# Patient Record
Sex: FEMALE | Race: WHITE | Employment: FULL TIME | URBAN - METROPOLITAN AREA
[De-identification: names, ages, dates, MRNs, and addresses within clinical notes are randomized per-mention and may not be internally consistent; named-entity substitution may affect disease eponyms.]

---

## 2022-03-17 LAB — STREP GP B CULT OB: POSITIVE

## 2022-03-20 PROBLEM — R82.71 GBS BACTERIURIA: Status: ACTIVE | Noted: 2022-03-20

## 2022-03-31 PROBLEM — A56.8 CHLAMYDIA TRACHOMATIS INFECTION IN PREGNANCY: Status: ACTIVE | Noted: 2022-03-31

## 2022-03-31 PROBLEM — O98.319 CHLAMYDIA TRACHOMATIS INFECTION IN PREGNANCY: Status: ACTIVE | Noted: 2022-03-31

## 2022-04-08 PROBLEM — Z34.02 ENCOUNTER FOR SUPERVISION OF NORMAL FIRST PREGNANCY IN SECOND TRIMESTER: Status: ACTIVE | Noted: 2022-04-08

## 2022-04-08 PROBLEM — O09.529 ADVANCED MATERNAL AGE IN MULTIGRAVIDA, UNSPECIFIED TRIMESTER: Status: ACTIVE | Noted: 2022-04-08

## 2022-05-27 ENCOUNTER — HOSPITAL ENCOUNTER (OUTPATIENT)
Dept: PERINATAL CARE | Facility: HOSPITAL | Age: 37
Discharge: HOME | End: 2022-05-27
Attending: OBSTETRICS & GYNECOLOGY
Payer: COMMERCIAL

## 2022-05-27 VITALS
BODY MASS INDEX: 24 KG/M2 | WEIGHT: 156 LBS | DIASTOLIC BLOOD PRESSURE: 66 MMHG | SYSTOLIC BLOOD PRESSURE: 107 MMHG | HEART RATE: 78 BPM

## 2022-05-27 DIAGNOSIS — O09.529 ADVANCED MATERNAL AGE IN MULTIGRAVIDA, UNSPECIFIED TRIMESTER: ICD-10-CM

## 2022-05-27 DIAGNOSIS — O09.529 ADVANCED MATERNAL AGE IN MULTIGRAVIDA, UNSPECIFIED TRIMESTER: Primary | ICD-10-CM

## 2022-05-27 PROCEDURE — 36415 COLL VENOUS BLD VENIPUNCTURE: CPT

## 2022-05-27 PROCEDURE — 76811 OB US DETAILED SNGL FETUS: CPT | Performed by: OBSTETRICS & GYNECOLOGY

## 2022-05-27 NOTE — ADDENDUM NOTE
Encounter addended by: Felix Fletcher RN on: 5/27/2022 12:20 PM   Actions taken: Clinical Note Signed, Charge Capture section accepted

## 2022-05-27 NOTE — PROGRESS NOTES
Pt for level 2 US  Denies pregnancy complaints  States active fetus    Pt called after leaving  appointment  desires NIPT testing  Returned for NIPT testing

## 2022-06-03 ENCOUNTER — TELEPHONE (OUTPATIENT)
Dept: PERINATAL CARE | Facility: HOSPITAL | Age: 37
End: 2022-06-03

## 2022-06-03 NOTE — TELEPHONE ENCOUNTER
[facility] screening results obt  Reviewed by Dr David London  Negative results for Trisomy 18/13/21  Fetal fraction 23%      Pt  Reminded on Use of [facility] registration card   Given at  Previous appointment   Pt can log in  and view results    Pt states understanding  Copy of results sent for scanning into pt record

## 2022-07-12 LAB — HIV RESULT OB: NEGATIVE

## 2022-08-12 ENCOUNTER — HOSPITAL ENCOUNTER (OUTPATIENT)
Dept: PERINATAL CARE | Facility: HOSPITAL | Age: 37
Discharge: HOME | End: 2022-08-12
Attending: OBSTETRICS & GYNECOLOGY
Payer: COMMERCIAL

## 2022-08-12 VITALS
HEART RATE: 75 BPM | DIASTOLIC BLOOD PRESSURE: 68 MMHG | WEIGHT: 166 LBS | SYSTOLIC BLOOD PRESSURE: 108 MMHG | BODY MASS INDEX: 26 KG/M2

## 2022-08-12 DIAGNOSIS — E03.9 HYPOTHYROIDISM, UNSPECIFIED TYPE: ICD-10-CM

## 2022-08-12 DIAGNOSIS — O09.529 ADVANCED MATERNAL AGE IN MULTIGRAVIDA, UNSPECIFIED TRIMESTER: Primary | ICD-10-CM

## 2022-08-12 DIAGNOSIS — O09.529 ADVANCED MATERNAL AGE IN MULTIGRAVIDA, UNSPECIFIED TRIMESTER: ICD-10-CM

## 2022-08-12 PROCEDURE — 76816 OB US FOLLOW-UP PER FETUS: CPT | Performed by: OBSTETRICS & GYNECOLOGY

## 2022-08-12 PROCEDURE — 76819 FETAL BIOPHYS PROFIL W/O NST: CPT

## 2022-10-04 ENCOUNTER — ULTRASOUND ENCOUNTER (OUTPATIENT)
Dept: PERINATAL CARE | Facility: HOSPITAL | Age: 37
End: 2022-10-04
Attending: OBSTETRICS & GYNECOLOGY
Payer: COMMERCIAL

## 2022-10-04 ENCOUNTER — HOSPITAL ENCOUNTER (OUTPATIENT)
Facility: HOSPITAL | Age: 37
Discharge: HOME | End: 2022-10-04
Attending: OBSTETRICS & GYNECOLOGY | Admitting: OBSTETRICS & GYNECOLOGY
Payer: COMMERCIAL

## 2022-10-04 VITALS
HEART RATE: 95 BPM | WEIGHT: 169 LBS | SYSTOLIC BLOOD PRESSURE: 122 MMHG | BODY MASS INDEX: 26.53 KG/M2 | TEMPERATURE: 98 F | RESPIRATION RATE: 16 BRPM | HEIGHT: 67 IN | DIASTOLIC BLOOD PRESSURE: 78 MMHG

## 2022-10-04 DIAGNOSIS — O09.529 ADVANCED MATERNAL AGE IN MULTIGRAVIDA, UNSPECIFIED TRIMESTER: ICD-10-CM

## 2022-10-04 PROCEDURE — 76815 OB US LIMITED FETUS(S): CPT

## 2022-10-04 PROCEDURE — 76818 FETAL BIOPHYS PROFILE W/NST: CPT | Performed by: OBSTETRICS & GYNECOLOGY

## 2022-10-04 PROCEDURE — 99214 OFFICE O/P EST MOD 30 MIN: CPT

## 2022-10-04 PROCEDURE — 76819 FETAL BIOPHYS PROFIL W/O NST: CPT | Performed by: OBSTETRICS & GYNECOLOGY

## 2022-10-04 PROCEDURE — 59025 FETAL NON-STRESS TEST: CPT

## 2022-10-04 NOTE — PROGRESS NOTES
Physician Evaluation      NST Interpretation: Reactive    Disposition:   Discharged    Comments:    F/u in 2 days    Jorge Krause MD

## 2022-10-04 NOTE — CONSULTS
2 8Encounter for BPP nonreactive NST in the office. This is a 42-year-old  1 at 40 weeks 0 days. Her pregnancy has been uncomplicated until her nonreactive NST today. She reports she had some fruit in the morning for does not have anything to eat the remainder of the day. She was given some ice water in triage which did improve the fetal movement. I reviewed the fetal heart rate tracing from triage and the baseline was in the 130s. There was moderate variability and accelerations noted. There were no decelerations or variables. There was some uterine irritability but no consistent contractions. I explained to the patient and her  that the  testing is reassuring but we discussed pros and cons of continuing pregnancy beyond 40 weeks gestation. We discussed optimal timing for induction of labor is between 39 weeks 0 days and 40-1/2 weeks. Her current plan is not to be induced until she is 42 weeks gestation. I explained there is a higher rate of  once a pregnancy is 41 weeks or beyond there is also increasing rates of meconium passage and meconium aspiration syndrome and post term pregnancies. Ultrasound Findings:  Single IUP in cephalic presentation. Placenta is posterior. Cardiac activity is present at 141 bpm  MVP is 3.5 cm . YESI 8.5 cm  BPP is 8/8. Impression:  1. IUP at  25X6X in cephalic presentation  2. BPP is 8/8      I called Dr. Silvia Franklin and updated her with the biophysical profile score. Vaso-occlusive stimulation was needed to elicit the fetal breathing movements. She will see the patient and discuss further options of staying for induction of labor or setting induction of in the next few days.       Total time spent 20 minutes this calendar day which includes preparing to see the patient including chart review, obtaining and/or reviewing additional medical history, performing a physical exam and evaluation, documenting clinical information in the electronic medical record, independently interpreting results, counseling the patient, communicating results to the patient/family/caregiver and coordinating care. Case discussed with patient who demonstrated understanding and agreement with plan. Thank you for allowing me to participate in the care of this patient. Please feel free to contact me with any questions.     Manuel Gupta MD  Maternal-Fetal Medicine

## 2022-10-04 NOTE — PROGRESS NOTES
Pt is a 44year old female admitted to TRG5/TRG5-A. Patient presents with:  Non-stress Test: pt had non reactive NST in office     Pt is  40w0d intra-uterine pregnancy. History obtained, consents signed. Oriented to room, staff, and plan of care.   Pt sent from OB office for nst  EFM and [place] applied   Pt denies cramping and contraction  Pt denies leaking and bleeding PV, + fetal movement  Vital signs WNL  PO hydration started   Will update  on pt status soon with NST results

## 2022-10-04 NOTE — PROGRESS NOTES
Pt given verbal and written discharge instructions and verbalized understanding of them. Pt home  With  in good conditions, labor precautions given. Pt will see OB in 2 days for another NST.

## 2022-10-04 NOTE — NST
Nonstress Test   Patient: Caridad Borer    Gestation: 40w0d    NST:       Variability: Moderate           Accelerations: Yes           Decelerations: None            Baseline: 130 BPM           Uterine Irritability: No           Contractions: Irregular                                        Acoustic Stimulator: No           Nonstress Test Interpretation: Reactive                                 Additional Comments

## 2022-10-08 ENCOUNTER — HOSPITAL ENCOUNTER (OUTPATIENT)
Facility: HOSPITAL | Age: 37
Discharge: HOME | End: 2022-10-08
Attending: OBSTETRICS & GYNECOLOGY | Admitting: OBSTETRICS & GYNECOLOGY
Payer: COMMERCIAL

## 2022-10-08 VITALS
TEMPERATURE: 99 F | SYSTOLIC BLOOD PRESSURE: 110 MMHG | HEART RATE: 104 BPM | HEIGHT: 67 IN | BODY MASS INDEX: 26.06 KG/M2 | DIASTOLIC BLOOD PRESSURE: 74 MMHG | WEIGHT: 166 LBS

## 2022-10-08 PROCEDURE — 59025 FETAL NON-STRESS TEST: CPT

## 2022-10-08 PROCEDURE — 99213 OFFICE O/P EST LOW 20 MIN: CPT

## 2022-10-08 NOTE — PROGRESS NOTES
Pt is a 44year old female admitted to TRG5/TRG5-A. Patient presents with:  R/o Labor: +FM denies lof, some bright red spotting, Pt has had ctx since 0745 yesterday morning with intenisty increasing around 2330. Pt is  40w4d intra-uterine pregnancy. History obtained, Oriented to room, staff, and plan of care.

## 2022-10-08 NOTE — NST
Nonstress Test   Patient: Yanelis Moore    Gestation: 40w4d    NST:       Variability: Moderate           Accelerations: Yes           Decelerations: None            Baseline: 140 BPM           Uterine Irritability: No           Contractions: Irregular                    Contraction Frequency: 4-10                   Acoustic Stimulator: No           Nonstress Test Interpretation: Reactive           Nonstress Test Second Interpretation: Reactive                     Additional Comments     Physician Evaluation      NST Interpretation: Reactive    Disposition:   Discharged    Comments:    Not in labor    Joseph Villa MD

## 2022-10-09 ENCOUNTER — ANESTHESIA (OUTPATIENT)
Dept: OBGYN UNIT | Facility: HOSPITAL | Age: 37
End: 2022-10-09
Payer: COMMERCIAL

## 2022-10-09 ENCOUNTER — HOSPITAL ENCOUNTER (INPATIENT)
Facility: HOSPITAL | Age: 37
LOS: 2 days | Discharge: HOME | End: 2022-10-11
Attending: OBSTETRICS & GYNECOLOGY | Admitting: OBSTETRICS & GYNECOLOGY
Payer: COMMERCIAL

## 2022-10-09 ENCOUNTER — ANESTHESIA EVENT (OUTPATIENT)
Dept: OBGYN UNIT | Facility: HOSPITAL | Age: 37
End: 2022-10-09
Payer: COMMERCIAL

## 2022-10-09 PROBLEM — Z34.90 PREGNANCY: Status: ACTIVE | Noted: 2022-10-09

## 2022-10-09 LAB
ANTIBODY SCREEN: NEGATIVE
BASOPHILS # BLD AUTO: 0.04 X10(3) UL (ref 0–0.2)
BASOPHILS NFR BLD AUTO: 0.4 %
EOSINOPHIL # BLD AUTO: 0.07 X10(3) UL (ref 0–0.7)
EOSINOPHIL NFR BLD AUTO: 0.6 %
ERYTHROCYTE [DISTWIDTH] IN BLOOD BY AUTOMATED COUNT: 14.6 %
HCT VFR BLD AUTO: 40.1 %
HGB BLD-MCNC: 12.9 G/DL
IMM GRANULOCYTES # BLD AUTO: 0.07 X10(3) UL (ref 0–1)
IMM GRANULOCYTES NFR BLD: 0.6 %
LYMPHOCYTES # BLD AUTO: 1.59 X10(3) UL (ref 1–4)
LYMPHOCYTES NFR BLD AUTO: 14 %
MCH RBC QN AUTO: 31.1 PG (ref 26–34)
MCHC RBC AUTO-ENTMCNC: 32.2 G/DL (ref 31–37)
MCV RBC AUTO: 96.6 FL
MONOCYTES # BLD AUTO: 0.68 X10(3) UL (ref 0.1–1)
MONOCYTES NFR BLD AUTO: 6 %
NEUTROPHILS # BLD AUTO: 8.91 X10 (3) UL (ref 1.5–7.7)
NEUTROPHILS # BLD AUTO: 8.91 X10(3) UL (ref 1.5–7.7)
NEUTROPHILS NFR BLD AUTO: 78.4 %
PLATELET # BLD AUTO: 327 10(3)UL (ref 150–450)
RBC # BLD AUTO: 4.15 X10(6)UL
RH BLOOD TYPE: POSITIVE
SARS-COV-2 RNA RESP QL NAA+PROBE: DETECTED
T PALLIDUM AB SER QL IA: NONREACTIVE
WBC # BLD AUTO: 11.4 X10(3) UL (ref 4–11)

## 2022-10-09 PROCEDURE — 86780 TREPONEMA PALLIDUM: CPT | Performed by: OBSTETRICS & GYNECOLOGY

## 2022-10-09 PROCEDURE — 88307 TISSUE EXAM BY PATHOLOGIST: CPT | Performed by: OBSTETRICS & GYNECOLOGY

## 2022-10-09 PROCEDURE — 86850 RBC ANTIBODY SCREEN: CPT | Performed by: OBSTETRICS & GYNECOLOGY

## 2022-10-09 PROCEDURE — 85025 COMPLETE CBC W/AUTO DIFF WBC: CPT | Performed by: OBSTETRICS & GYNECOLOGY

## 2022-10-09 PROCEDURE — 86900 BLOOD TYPING SEROLOGIC ABO: CPT | Performed by: OBSTETRICS & GYNECOLOGY

## 2022-10-09 PROCEDURE — 86901 BLOOD TYPING SEROLOGIC RH(D): CPT | Performed by: OBSTETRICS & GYNECOLOGY

## 2022-10-09 PROCEDURE — 10907ZC DRAINAGE OF AMNIOTIC FLUID, THERAPEUTIC FROM PRODUCTS OF CONCEPTION, VIA NATURAL OR ARTIFICIAL OPENING: ICD-10-PCS | Performed by: OBSTETRICS & GYNECOLOGY

## 2022-10-09 PROCEDURE — 99214 OFFICE O/P EST MOD 30 MIN: CPT

## 2022-10-09 RX ORDER — BUPIVACAINE HCL/0.9 % NACL/PF 0.25 %
5 PLASTIC BAG, INJECTION (ML) EPIDURAL AS NEEDED
Status: DISCONTINUED | OUTPATIENT
Start: 2022-10-09 | End: 2022-10-10

## 2022-10-09 RX ORDER — ONDANSETRON 2 MG/ML
4 INJECTION INTRAMUSCULAR; INTRAVENOUS EVERY 6 HOURS PRN
Status: DISCONTINUED | OUTPATIENT
Start: 2022-10-09 | End: 2022-10-10 | Stop reason: HOSPADM

## 2022-10-09 RX ORDER — TRISODIUM CITRATE DIHYDRATE AND CITRIC ACID MONOHYDRATE 500; 334 MG/5ML; MG/5ML
30 SOLUTION ORAL AS NEEDED
Status: DISCONTINUED | OUTPATIENT
Start: 2022-10-09 | End: 2022-10-10 | Stop reason: HOSPADM

## 2022-10-09 RX ORDER — TERBUTALINE SULFATE 1 MG/ML
0.25 INJECTION, SOLUTION SUBCUTANEOUS AS NEEDED
Status: DISCONTINUED | OUTPATIENT
Start: 2022-10-09 | End: 2022-10-10 | Stop reason: HOSPADM

## 2022-10-09 RX ORDER — AMMONIA INHALANTS 0.04 G/.3ML
0.3 INHALANT RESPIRATORY (INHALATION) AS NEEDED
Status: DISCONTINUED | OUTPATIENT
Start: 2022-10-09 | End: 2022-10-10 | Stop reason: HOSPADM

## 2022-10-09 RX ORDER — IBUPROFEN 600 MG/1
600 TABLET ORAL EVERY 6 HOURS PRN
Status: DISCONTINUED | OUTPATIENT
Start: 2022-10-09 | End: 2022-10-10 | Stop reason: HOSPADM

## 2022-10-09 RX ORDER — DEXTROSE, SODIUM CHLORIDE, SODIUM LACTATE, POTASSIUM CHLORIDE, AND CALCIUM CHLORIDE 5; .6; .31; .03; .02 G/100ML; G/100ML; G/100ML; G/100ML; G/100ML
INJECTION, SOLUTION INTRAVENOUS AS NEEDED
Status: DISCONTINUED | OUTPATIENT
Start: 2022-10-09 | End: 2022-10-10 | Stop reason: HOSPADM

## 2022-10-09 RX ORDER — NALBUPHINE HCL 10 MG/ML
2.5 AMPUL (ML) INJECTION
Status: DISCONTINUED | OUTPATIENT
Start: 2022-10-09 | End: 2022-10-10

## 2022-10-09 RX ORDER — ACETAMINOPHEN 500 MG
500 TABLET ORAL EVERY 6 HOURS PRN
Status: DISCONTINUED | OUTPATIENT
Start: 2022-10-09 | End: 2022-10-10 | Stop reason: HOSPADM

## 2022-10-09 RX ORDER — SODIUM CHLORIDE, SODIUM LACTATE, POTASSIUM CHLORIDE, CALCIUM CHLORIDE 600; 310; 30; 20 MG/100ML; MG/100ML; MG/100ML; MG/100ML
INJECTION, SOLUTION INTRAVENOUS CONTINUOUS PRN
Status: DISCONTINUED | OUTPATIENT
Start: 2022-10-09 | End: 2022-10-10 | Stop reason: HOSPADM

## 2022-10-09 RX ORDER — LIDOCAINE HYDROCHLORIDE AND EPINEPHRINE 15; 5 MG/ML; UG/ML
INJECTION, SOLUTION EPIDURAL AS NEEDED
Status: DISCONTINUED | OUTPATIENT
Start: 2022-10-09 | End: 2022-10-10 | Stop reason: SURG

## 2022-10-09 RX ADMIN — LIDOCAINE HYDROCHLORIDE AND EPINEPHRINE 5 ML: 15; 5 INJECTION, SOLUTION EPIDURAL at 19:05:00

## 2022-10-09 NOTE — PROGRESS NOTES
Pt is a 44year old female who presents to TRG3/TRG3-A. Patient presents with:  R/o Labor     Pt is  40w5d intra-uterine pregnancy. States contractions started at 0000 and got stronger around 0300. Says she feels uc's q 2-10mins, Reports good fetal movement, denies bleeding. Denies leaking of fluid.  History obtained,

## 2022-10-09 NOTE — PLAN OF CARE
Problem: Patient/Family Goals  Goal: Patient/Family Long Term Goal  Description: Patient's Long Term Goal:     Interventions:  -   - See additional Care Plan goals for specific interventions  Outcome: Progressing  Goal: Patient/Family Short Term Goal  Description: Patient's Short Term Goal:     Interventions:   -   - See additional Care Plan goals for specific interventions  Outcome: Progressing     Problem: BIRTH - VAGINAL/ SECTION  Goal: Fetal and maternal status remain reassuring during the birth process  Description: INTERVENTIONS:  - Monitor vital signs  - Monitor fetal heart rate  - Monitor uterine activity  - Monitor labor progression (vaginal delivery)  - DVT prophylaxis (C/S delivery)  - Surgical antibiotic prophylaxis (C/S delivery)  Outcome: Progressing     Problem: PAIN - ADULT  Goal: Verbalizes/displays adequate comfort level or patient's stated pain goal  Description: INTERVENTIONS:  - Encourage pt to monitor pain and request assistance  - Assess pain using appropriate pain scale  - Administer analgesics based on type and severity of pain and evaluate response  - Implement non-pharmacological measures as appropriate and evaluate response  - Consider cultural and social influences on pain and pain management  - Manage/alleviate anxiety  - Utilize distraction and/or relaxation techniques  - Monitor for opioid side effects  - Notify MD/LIP if interventions unsuccessful or patient reports new pain  - Anticipate increased pain with activity and pre-medicate as appropriate  Outcome: Progressing     Problem: ANXIETY  Goal: Will report anxiety at manageable levels  Description: INTERVENTIONS:  - Administer medication as ordered  - Teach and rehearse alternative coping skills  - Provide emotional support with 1:1 interaction with staff  Outcome: Progressing

## 2022-10-10 LAB
BASOPHILS # BLD AUTO: 0.04 X10(3) UL (ref 0–0.2)
BASOPHILS NFR BLD AUTO: 0.2 %
EOSINOPHIL # BLD AUTO: 0.01 X10(3) UL (ref 0–0.7)
EOSINOPHIL NFR BLD AUTO: 0.1 %
ERYTHROCYTE [DISTWIDTH] IN BLOOD BY AUTOMATED COUNT: 14.5 %
HCT VFR BLD AUTO: 34.4 %
HGB BLD-MCNC: 11.1 G/DL
IMM GRANULOCYTES # BLD AUTO: 0.11 X10(3) UL (ref 0–1)
IMM GRANULOCYTES NFR BLD: 0.6 %
LYMPHOCYTES # BLD AUTO: 1.65 X10(3) UL (ref 1–4)
LYMPHOCYTES NFR BLD AUTO: 8.7 %
MCH RBC QN AUTO: 31 PG (ref 26–34)
MCHC RBC AUTO-ENTMCNC: 32.3 G/DL (ref 31–37)
MCV RBC AUTO: 96.1 FL
MONOCYTES # BLD AUTO: 1.19 X10(3) UL (ref 0.1–1)
MONOCYTES NFR BLD AUTO: 6.3 %
NEUTROPHILS # BLD AUTO: 16 X10 (3) UL (ref 1.5–7.7)
NEUTROPHILS # BLD AUTO: 16 X10(3) UL (ref 1.5–7.7)
NEUTROPHILS NFR BLD AUTO: 84.1 %
PLATELET # BLD AUTO: 326 10(3)UL (ref 150–450)
RBC # BLD AUTO: 3.58 X10(6)UL
WBC # BLD AUTO: 19 X10(3) UL (ref 4–11)

## 2022-10-10 PROCEDURE — 0HQ9XZZ REPAIR PERINEUM SKIN, EXTERNAL APPROACH: ICD-10-PCS | Performed by: OBSTETRICS & GYNECOLOGY

## 2022-10-10 PROCEDURE — 85025 COMPLETE CBC W/AUTO DIFF WBC: CPT | Performed by: OBSTETRICS & GYNECOLOGY

## 2022-10-10 RX ORDER — DOCUSATE SODIUM 100 MG/1
100 CAPSULE, LIQUID FILLED ORAL
Status: DISCONTINUED | OUTPATIENT
Start: 2022-10-10 | End: 2022-10-11

## 2022-10-10 RX ORDER — ACETAMINOPHEN 500 MG
500 TABLET ORAL EVERY 6 HOURS PRN
Status: DISCONTINUED | OUTPATIENT
Start: 2022-10-10 | End: 2022-10-11

## 2022-10-10 RX ORDER — IBUPROFEN 600 MG/1
600 TABLET ORAL EVERY 6 HOURS
Status: DISCONTINUED | OUTPATIENT
Start: 2022-10-10 | End: 2022-10-11

## 2022-10-10 RX ORDER — SIMETHICONE 80 MG
80 TABLET,CHEWABLE ORAL 3 TIMES DAILY PRN
Status: DISCONTINUED | OUTPATIENT
Start: 2022-10-10 | End: 2022-10-11

## 2022-10-10 RX ORDER — BISACODYL 10 MG
10 SUPPOSITORY, RECTAL RECTAL ONCE AS NEEDED
Status: DISCONTINUED | OUTPATIENT
Start: 2022-10-10 | End: 2022-10-11

## 2022-10-10 RX ORDER — ACETAMINOPHEN 500 MG
1000 TABLET ORAL EVERY 6 HOURS PRN
Status: DISCONTINUED | OUTPATIENT
Start: 2022-10-10 | End: 2022-10-11

## 2022-10-10 RX ORDER — LEVOTHYROXINE SODIUM 112 UG/1
112 TABLET ORAL
Status: DISCONTINUED | OUTPATIENT
Start: 2022-10-11 | End: 2022-10-11

## 2022-10-10 NOTE — L&D DELIVERY NOTE
Faye Rodrigez, Girl [[id number]]    Labor Events     labor?: No   steroids?: None  Antibiotics received during labor?: Yes  Antibiotics (enter # doses in comment): ampicillin  Rupture date/time: 10/9/2022 2128     Rupture type: AROM  Fluid color: Clear  Augmentation: Oxytocin  Indications for augmentation: Ineffective Contraction Pattern     Labor Event Times    Dilation complete date/time: 10/10/2022 0013  Start pushing date/time: 10/10/2022 0104     [place] Presentation    Presentation: Vertex  Position: Right Occiput Anterior     Operative Delivery    Operative Vaginal Delivery: No            Shoulder Dystocia    Shoulder Dystocia: No     Anesthesia    Method: Epidural          [place] Delivery    Head delivery date/time: 10/10/2022 00:56:26   Delivery date/time:  10/10/22 00:56:42   Delivery type: Normal spontaneous vaginal delivery    Details:     Delivery location: delivery room     Delivery Providers    Delivering Clinician: Dale Iglesias MD   Delivery personnel:  Provider Role   Kimberli Mitchell RN Baby Nurse   Pablo Severance, RN Delivery Nurse         Cord    Vessels: 3 Vessels  Complications: None  Timed cord clamping: Yes  Time in sec: 39  Cord blood disposition: not collected  Gases sent?: No      Measurements    Weight: 3430 g 7 lb 9 oz Length: 48.3 cm   Head circum.: 33.5 cm Chest circum.: 33 cm      Abdominal circum.: 32 cm       Placenta    Date/time: 10/10/2022 0059  Removal: Spontaneous  Appearance: Intact  Disposition: Pathology     Apgars    Living status: Living   Apgar Scoring Key:    0 1 2    Skin color Blue or pale Acrocyanotic Completely pink    Heart rate Absent <100 bpm >100 bpm    Reflex irritability No response Grimace Cry or active withdrawal    Muscle tone Limp Some flexion Active motion    Respiratory effort Absent Weak cry; hypoventilation Good, crying              1 Minute:  5 Minute:  10 Minute:  15 Minute:  20 Minute:    Skin color: 0  1 Heart rate: 2  2       Reflex irritablity: 2  2       Muscle tone: 2  2       Respiratory effort: 2  2       Total: 8  9          Apgars assigned by: Fito Reina  [place] disposition: with mother     Skin to Skin    No data filed     Vaginal Count    Initial count RN: Soco Romo RN  Initial count Tech: Ainsley Gibbons   [employer]    Initial counts 11   0    Final counts 11   1    Final count RN: Carlos Fuentes RN  Final count MD: Leobardo Gallegos MD     Delivery (Maternal)    Episiotomy: None  Perineal lacerations: 1st Repaired?: Yes   Periurethral laceration: bilateral Repaired?: Yes   Vaginal laceration?: No    Cervical laceration?: No    Clitoral laceration?: No              Delivery Note    Date: 10/10/22    Preop diagnosis: IUP at term    Postop diagnosis: same    Procedure:     Attending: Dr. Faye Starkey    Findings: viable female infant, weighing 7 lbs 9 oz, APGARs 8,9. Placenta delivered spontaneously and intact. Infant bulb suctioned at perineum. Lacerations: left periurethral and 1st degree vaginal lacerations repaired with 2-0 vicryl rapide using routine technique.     EBL: 193 ml    Complications: none    Condition: stable    Fani España MD  10/10/2022  1:11 AM

## 2022-10-10 NOTE — PLAN OF CARE
Problem: Patient/Family Goals  Goal: Patient/Family Long Term Goal  Description: Patient's Long Term Goal:     Interventions:  -   - See additional Care Plan goals for specific interventions  10/10/2022 0155 by Hina Alonso RN  Outcome: Completed  10/9/2022 1934 by Hina Alonso RN  Outcome: Progressing  Goal: Patient/Family Short Term Goal  Description: Patient's Short Term Goal:     Interventions:   -   - See additional Care Plan goals for specific interventions  10/10/2022 0155 by Hina Alonso RN  Outcome: Completed  10/9/2022 1934 by Hina Alonso RN  Outcome: Progressing     Problem: BIRTH - VAGINAL/ SECTION  Goal: Fetal and maternal status remain reassuring during the birth process  Description: INTERVENTIONS:  - Monitor vital signs  - Monitor fetal heart rate  - Monitor uterine activity  - Monitor labor progression (vaginal delivery)  - DVT prophylaxis (C/S delivery)  - Surgical antibiotic prophylaxis (C/S delivery)  10/10/2022 0155 by Hina Alonso RN  Outcome: Completed  10/9/2022 1934 by Hina Alonso RN  Outcome: Progressing     Problem: PAIN - ADULT  Goal: Verbalizes/displays adequate comfort level or patient's stated pain goal  Description: INTERVENTIONS:  - Encourage pt to monitor pain and request assistance  - Assess pain using appropriate pain scale  - Administer analgesics based on type and severity of pain and evaluate response  - Implement non-pharmacological measures as appropriate and evaluate response  - Consider cultural and social influences on pain and pain management  - Manage/alleviate anxiety  - Utilize distraction and/or relaxation techniques  - Monitor for opioid side effects  - Notify MD/LIP if interventions unsuccessful or patient reports new pain  - Anticipate increased pain with activity and pre-medicate as appropriate  10/10/2022 0155 by Hina Alonso RN  Outcome: Completed  10/9/2022 1934 by Hina Alonso RN  Outcome: Progressing     Problem: ANXIETY  Goal: Will report anxiety at manageable levels  Description: INTERVENTIONS:  - Administer medication as ordered  - Teach and rehearse alternative coping skills  - Provide emotional support with 1:1 interaction with staff  10/10/2022 0155 by Melissa Paulino RN  Outcome: Completed  10/9/2022 1934 by Melissa Paulino RN  Outcome: Progressing     Problem: SAFETY ADULT - FALL  Goal: Free from fall injury  Description: INTERVENTIONS:  - Assess pt frequently for physical needs  - Identify cognitive and physical deficits and behaviors that affect risk of falls.   - [place] fall precautions as indicated by assessment.  - Educate pt/family on patient safety including physical limitations  - Instruct pt to call for assistance with activity based on assessment  - Modify environment to reduce risk of injury  - Provide assistive devices as appropriate  - Consider OT/PT consult to assist with strengthening/mobility  - Encourage toileting schedule  Outcome: Completed     Problem: SKIN/TISSUE INTEGRITY - ADULT  Goal: Skin integrity remains intact  Description: INTERVENTIONS  - Assess and document risk factors for pressure ulcer development  - Assess and document skin integrity  - Monitor for areas of redness and/or skin breakdown  - Initiate interventions, skin care algorithm/standards of care as needed  Outcome: Completed  Goal: Incision(s), wounds(s) or drain site(s) healing without S/S of infection  Description: INTERVENTIONS:  - Assess and document risk factors for pressure ulcer development  - Assess and document skin integrity  - Assess and document dressing/incision, wound bed, drain sites and surrounding tissue  - Implement wound care per orders  - Initiate isolation precautions as appropriate  - Initiate Pressure Ulcer prevention bundle as indicated  Outcome: Completed  Goal: Oral mucous membranes remain intact  Description: INTERVENTIONS  - Assess oral mucosa and hygiene practices  - Implement preventative oral hygiene regimen  - Implement oral medicated treatments as ordered  Outcome: Completed

## 2022-10-10 NOTE — PROGRESS NOTES
NURSING ADMISSION NOTE    Received  and mother in stable condition in room 2193. ID bands checked and verified with parents and L&D RN. Safety precautions initiated. Call light within reach, bed in low position. Patient and family oriented to room and updated on POC.

## 2022-10-10 NOTE — PROGRESS NOTES
Patient up to bathroom with assist x 2. Voided  at this time. Patient transferred to mother/baby room 2913 per wheelchair in stable condition with baby and personal belongings. Accompanied by significant other and staff. Report given to mother/baby RN.

## 2022-10-10 NOTE — PLAN OF CARE
Problem: Patient/Family Goals  Goal: Patient/Family Long Term Goal  Description: Patient's Long Term Goal: to have a healthy baby      Interventions:    - See additional Care Plan goals for specific interventions  Outcome: Progressing  Goal: Patient/Family Short Term Goal  Description: Patient's Short Term Goal: adequate pain control      Interventions:   -   - See additional Care Plan goals for specific interventions  Outcome: Progressing     Problem: BIRTH - VAGINAL/ SECTION  Goal: Fetal and maternal status remain reassuring during the birth process  Description: INTERVENTIONS:  - Monitor vital signs  - Monitor fetal heart rate  - Monitor uterine activity  - Monitor labor progression (vaginal delivery)  - DVT prophylaxis (C/S delivery)  - Surgical antibiotic prophylaxis (C/S delivery)  Outcome: Progressing     Problem: PAIN - ADULT  Goal: Verbalizes/displays adequate comfort level or patient's stated pain goal  Description: INTERVENTIONS:  - Encourage pt to monitor pain and request assistance  - Assess pain using appropriate pain scale  - Administer analgesics based on type and severity of pain and evaluate response  - Implement non-pharmacological measures as appropriate and evaluate response  - Consider cultural and social influences on pain and pain management  - Manage/alleviate anxiety  - Utilize distraction and/or relaxation techniques  - Monitor for opioid side effects  - Notify MD/LIP if interventions unsuccessful or patient reports new pain  - Anticipate increased pain with activity and pre-medicate as appropriate  Outcome: Progressing     Problem: ANXIETY  Goal: Will report anxiety at manageable levels  Description: INTERVENTIONS:  - Administer medication as ordered  - Teach and rehearse alternative coping skills  - Provide emotional support with 1:1 interaction with staff  Outcome: Progressing

## 2022-10-10 NOTE — ANESTHESIA PROCEDURE NOTES
Labor Analgesia  Performed by: Alexus Quintanilla MD  Authorized by: Alexus Quintanilla MD       General Information and Staff    Start Time:  10/9/2022 6:55 PM  End Time:  10/9/2022 7:05 PM  Anesthesiologist:  Alexus Quintanilla MD  Performed by:   Anesthesiologist  Patient Location:  OB  Site Identification: surface landmarks    Reason for Block: labor epidural    Preanesthetic Checklist: patient identified, IV checked, risks and benefits discussed, monitors and equipment checked, pre-op evaluation, timeout performed, IV bolus, anesthesia consent and sterile technique used      Procedure Details    Patient Position:  Sitting  Prep: ChloraPrep    Monitoring:  Heart rate and continuous pulse ox  Approach:  Midline    Epidural Needle    Injection Technique:   Needle Type: Tuohy  Needle Gauge:  17 G  Needle Length:  3.375 in  Needle Insertion Depth:  5  Location:  L3-4    Spinal Needle      Catheter    Catheter Type:  End hole  Catheter Size:  19 G  Catheter at Skin Depth:  12  Test Dose:  Negative    Assessment  Sensory Level:  T6    Additional Comments     Test Dose Given at 1905 pm

## 2022-10-11 VITALS
SYSTOLIC BLOOD PRESSURE: 105 MMHG | HEART RATE: 82 BPM | DIASTOLIC BLOOD PRESSURE: 74 MMHG | OXYGEN SATURATION: 100 % | BODY MASS INDEX: 26 KG/M2 | RESPIRATION RATE: 20 BRPM | TEMPERATURE: 98 F | WEIGHT: 166.69 LBS

## 2022-10-11 NOTE — PROGRESS NOTES
Patient complaints: none. Vital signs reviewed    Examination:  General: alert, appropriate affect  Abdomen: Fundus firm and no unexpected tenderness.   Remainder of abdomen unremarkable  Lochia: appropriate  Extremities: nontender, no excessive edema, symmetric    Recent Labs   Lab 10/09/22  1214 10/10/22  0737   RBC 4.15 3.58*   HGB 12.9 11.1*   HCT 40.1 34.4*   MCV 96.6 96.1   MCH 31.1 31.0   MCHC 32.2 32.3   RDW 14.6 14.5   NEPRELIM 8.91* 16.00*   WBC 11.4* 19.0*   .0 326.0          Impression:   Postpartum day #1+ from vaginal birth, recuperating appropriately, anticipate routine discharge  Home today desired

## 2022-10-11 NOTE — PROGRESS NOTES
Discharge teaching discussed and questions answered. Pt states she understands instructions. Copies of instructions given to pt. Pt feels confident caring for self and . ID bands verified. Pt discharged home in stable condition.

## 2022-10-14 ENCOUNTER — TELEPHONE (OUTPATIENT)
Dept: OBGYN UNIT | Facility: HOSPITAL | Age: 37
End: 2022-10-14

## 2022-10-15 ENCOUNTER — TELEPHONE (OUTPATIENT)
Dept: OBGYN UNIT | Facility: HOSPITAL | Age: 37
End: 2022-10-15

## 2022-10-15 NOTE — PROGRESS NOTES
Reviewed self and infant care w / mom, she verbalizes understanding of instructions reviewed. Encourage to follow up w/ MDs as directed and w/ questions/concerns.  No concerns now, enc to join ct

## 2022-10-25 NOTE — CDS QUERY
How to answer this Query:  1.) Click \"Edit button\" on the toolbar.  2.) Type an \"X\" in the bracket for the diagnosis that applies. (You may also add additional clinical details as you feel necessary to substantiate your response). 3.) Finally click \"Sign\" to complete response. Thank You    Covid-19 Status Clarification  Nora Hsu    Dear Dr. Arthur Desai,  Clinical information (provided below) includes documentation of a positive test for Covid-19 Infection on admission. For accurate ICD-10-CM code assignment to reflect severity of illness and risk of mortality,  BASED ON YOUR CLINICAL JUDGEMENT, PLEASE SELECT   THE MOST APPROPRIATE DIAGNOSIS:     (x    ) Covid-19 is a current & active infection   (    ) Covid-19 is not an active infection (personal history)  (   )  Other    (    ) Clinically unable to determine  ____________________________________________________________________________                                                    Documentation from the Medical Record:  Risk: no known + contact per form. Unknown vaccine status  Clinical indicator: + Rapid SARS-CoV-2 by PCR on admission 10/9. No documented symptoms. Treatment: No Covid related medications. Contact and droplet isolation    For questions regarding this query, please contact Clinical Documentation :  Veena Vasques RN, BSN ext [phone] [email or web address].                        Thank you                                                           THIS FORM IS A PERMANENT PART OF THE MEDICAL RECORD

## 2023-09-19 ENCOUNTER — HOSPITAL ENCOUNTER (EMERGENCY)
Facility: HOSPITAL | Age: 38
Discharge: HOME | End: 2023-09-19
Attending: EMERGENCY MEDICINE
Payer: COMMERCIAL

## 2023-09-19 ENCOUNTER — APPOINTMENT (OUTPATIENT)
Dept: CT IMAGING | Facility: HOSPITAL | Age: 38
End: 2023-09-19
Attending: EMERGENCY MEDICINE
Payer: COMMERCIAL

## 2023-09-19 VITALS
BODY MASS INDEX: 21.19 KG/M2 | HEIGHT: 67 IN | SYSTOLIC BLOOD PRESSURE: 99 MMHG | RESPIRATION RATE: 20 BRPM | DIASTOLIC BLOOD PRESSURE: 69 MMHG | TEMPERATURE: 100 F | WEIGHT: 135 LBS | HEART RATE: 88 BPM | OXYGEN SATURATION: 98 %

## 2023-09-19 DIAGNOSIS — L03.116 CELLULITIS AND ABSCESS OF LEFT LEG: Primary | ICD-10-CM

## 2023-09-19 DIAGNOSIS — L02.416 CELLULITIS AND ABSCESS OF LEFT LEG: Primary | ICD-10-CM

## 2023-09-19 LAB
ALBUMIN SERPL-MCNC: 3.3 G/DL (ref 3.4–5)
ALBUMIN/GLOB SERPL: 0.8 {RATIO} (ref 1–2)
ALP LIVER SERPL-CCNC: 73 U/L
ALT SERPL-CCNC: 17 U/L
ANION GAP SERPL CALC-SCNC: 7 MMOL/L (ref 0–18)
AST SERPL-CCNC: 12 U/L (ref 15–37)
BASOPHILS # BLD AUTO: 0.06 X10(3) UL (ref 0–0.2)
BASOPHILS NFR BLD AUTO: 0.4 %
BILIRUB SERPL-MCNC: 1 MG/DL (ref 0.1–2)
BUN BLD-MCNC: 14 MG/DL (ref 7–18)
CALCIUM BLD-MCNC: 9.2 MG/DL (ref 8.5–10.1)
CHLORIDE SERPL-SCNC: 106 MMOL/L (ref 98–112)
CO2 SERPL-SCNC: 26 MMOL/L (ref 21–32)
CREAT BLD-MCNC: 1.06 MG/DL
EGFRCR SERPLBLD CKD-EPI 2021: 69 ML/MIN/1.73M2 (ref 60–?)
EOSINOPHIL # BLD AUTO: 0.13 X10(3) UL (ref 0–0.7)
EOSINOPHIL NFR BLD AUTO: 1 %
ERYTHROCYTE [DISTWIDTH] IN BLOOD BY AUTOMATED COUNT: 12.9 %
GLOBULIN PLAS-MCNC: 4.3 G/DL (ref 2.8–4.4)
GLUCOSE BLD-MCNC: 100 MG/DL (ref 70–99)
HCT VFR BLD AUTO: 39.3 %
HGB BLD-MCNC: 13.2 G/DL
IMM GRANULOCYTES # BLD AUTO: 0.06 X10(3) UL (ref 0–1)
IMM GRANULOCYTES NFR BLD: 0.4 %
LACTATE SERPL-SCNC: 0.7 MMOL/L (ref 0.4–2)
LYMPHOCYTES # BLD AUTO: 1.68 X10(3) UL (ref 1–4)
LYMPHOCYTES NFR BLD AUTO: 12.4 %
MCH RBC QN AUTO: 31.2 PG (ref 26–34)
MCHC RBC AUTO-ENTMCNC: 33.6 G/DL (ref 31–37)
MCV RBC AUTO: 92.9 FL
MONOCYTES # BLD AUTO: 0.93 X10(3) UL (ref 0.1–1)
MONOCYTES NFR BLD AUTO: 6.9 %
NEUTROPHILS # BLD AUTO: 10.7 X10 (3) UL (ref 1.5–7.7)
NEUTROPHILS # BLD AUTO: 10.7 X10(3) UL (ref 1.5–7.7)
NEUTROPHILS NFR BLD AUTO: 78.9 %
OSMOLALITY SERPL CALC.SUM OF ELEC: 289 MOSM/KG (ref 275–295)
PLATELET # BLD AUTO: 234 10(3)UL (ref 150–450)
POTASSIUM SERPL-SCNC: 3.8 MMOL/L (ref 3.5–5.1)
PROCALCITONIN SERPL-MCNC: 0.06 NG/ML (ref ?–0.16)
PROT SERPL-MCNC: 7.6 G/DL (ref 6.4–8.2)
RBC # BLD AUTO: 4.23 X10(6)UL
SODIUM SERPL-SCNC: 139 MMOL/L (ref 136–145)
WBC # BLD AUTO: 13.6 X10(3) UL (ref 4–11)

## 2023-09-19 PROCEDURE — 10061 I&D ABSCESS COMP/MULTIPLE: CPT

## 2023-09-19 PROCEDURE — 80053 COMPREHEN METABOLIC PANEL: CPT | Performed by: EMERGENCY MEDICINE

## 2023-09-19 PROCEDURE — 83605 ASSAY OF LACTIC ACID: CPT | Performed by: EMERGENCY MEDICINE

## 2023-09-19 PROCEDURE — 99284 EMERGENCY DEPT VISIT MOD MDM: CPT

## 2023-09-19 PROCEDURE — 36415 COLL VENOUS BLD VENIPUNCTURE: CPT

## 2023-09-19 PROCEDURE — 84145 PROCALCITONIN (PCT): CPT | Performed by: EMERGENCY MEDICINE

## 2023-09-19 PROCEDURE — 96375 TX/PRO/DX INJ NEW DRUG ADDON: CPT

## 2023-09-19 PROCEDURE — 87040 BLOOD CULTURE FOR BACTERIA: CPT | Performed by: EMERGENCY MEDICINE

## 2023-09-19 PROCEDURE — 96365 THER/PROPH/DIAG IV INF INIT: CPT

## 2023-09-19 PROCEDURE — 99285 EMERGENCY DEPT VISIT HI MDM: CPT

## 2023-09-19 PROCEDURE — 73701 CT LOWER EXTREMITY W/DYE: CPT | Performed by: EMERGENCY MEDICINE

## 2023-09-19 PROCEDURE — 85025 COMPLETE CBC W/AUTO DIFF WBC: CPT | Performed by: EMERGENCY MEDICINE

## 2023-09-19 RX ORDER — MORPHINE SULFATE 4 MG/ML
4 INJECTION, SOLUTION INTRAMUSCULAR; INTRAVENOUS ONCE
Status: COMPLETED | OUTPATIENT
Start: 2023-09-19 | End: 2023-09-19

## 2023-09-19 RX ORDER — AMOXICILLIN 875 MG/1
875 TABLET, COATED ORAL 2 TIMES DAILY
COMMUNITY

## 2023-09-19 RX ORDER — CLINDAMYCIN HYDROCHLORIDE 150 MG/1
450 CAPSULE ORAL 3 TIMES DAILY
Qty: 90 CAPSULE | Refills: 0 | Status: SHIPPED | OUTPATIENT
Start: 2023-09-19 | End: 2023-09-29

## 2023-09-19 RX ORDER — CLINDAMYCIN PHOSPHATE 600 MG/50ML
600 INJECTION INTRAVENOUS ONCE
Status: COMPLETED | OUTPATIENT
Start: 2023-09-19 | End: 2023-09-19

## 2023-09-19 NOTE — ED INITIAL ASSESSMENT (HPI)
Patient to ED c/o left calf redness, swelling, and pain for about 1 week, worse the past 2 days . Thinks it started with an ingrown hair.    Patient started amoxicillin for a root canal 2 days ago